# Patient Record
Sex: FEMALE | Race: WHITE | NOT HISPANIC OR LATINO | Employment: OTHER | URBAN - METROPOLITAN AREA
[De-identification: names, ages, dates, MRNs, and addresses within clinical notes are randomized per-mention and may not be internally consistent; named-entity substitution may affect disease eponyms.]

---

## 2024-04-29 ENCOUNTER — OFFICE VISIT (OUTPATIENT)
Dept: GASTROENTEROLOGY | Facility: CLINIC | Age: 69
End: 2024-04-29
Payer: COMMERCIAL

## 2024-04-29 VITALS
DIASTOLIC BLOOD PRESSURE: 62 MMHG | WEIGHT: 140.6 LBS | HEIGHT: 65 IN | SYSTOLIC BLOOD PRESSURE: 106 MMHG | BODY MASS INDEX: 23.43 KG/M2

## 2024-04-29 DIAGNOSIS — Z12.11 SCREENING FOR COLON CANCER: ICD-10-CM

## 2024-04-29 DIAGNOSIS — K58.1 IRRITABLE BOWEL SYNDROME WITH CONSTIPATION: ICD-10-CM

## 2024-04-29 DIAGNOSIS — K59.09 OTHER CONSTIPATION: Primary | ICD-10-CM

## 2024-04-29 PROCEDURE — 99203 OFFICE O/P NEW LOW 30 MIN: CPT | Performed by: INTERNAL MEDICINE

## 2024-04-29 RX ORDER — LEVOTHYROXINE SODIUM 137 MCG
TABLET ORAL
COMMUNITY
Start: 2024-01-23

## 2024-04-29 RX ORDER — HYOSCYAMINE SULFATE 0.125 MG
125 TABLET ORAL EVERY 4 HOURS PRN
COMMUNITY
Start: 2024-01-31

## 2024-04-29 RX ORDER — PAROXETINE 30 MG/1
30 TABLET, FILM COATED ORAL DAILY
COMMUNITY
Start: 2024-01-23

## 2024-04-29 RX ORDER — ALPRAZOLAM 0.5 MG/1
0.5 TABLET ORAL
COMMUNITY
Start: 2024-03-29

## 2024-04-29 NOTE — PROGRESS NOTES
Formerly [facility] - Outpatient Consultation  Miriam Addison 69 y.o. female MRN: [id number]  Encounter: [id number]    ASSESSMENT AND PLAN:      1. Other constipation  Chronic longstanding.  Up-to-date on recommended colonoscopy her last 1 was 8 years ago she has no family history she was told to return in 10 years.  I suspect this is primarily irritable bowel induced constipation we discussed the role of trigger foods and stress.  We discussed the merits of fiber I did give her a copy of a high-fiber diet.  We discussed treatment alternatives to milk of magnesia including Linzess she wants to think about this.  She is presently happy with her regimen as is.  I have ordered routine blood work including celiac studies to exclude celiac which she does not believe has been done in the past.  Will see her in follow-up in 2 to 3 months.  - CBC; Future  - Comprehensive metabolic panel; Future  - Celiac Disease Panel; Future    2. Irritable bowel syndrome with constipation  As above    3. Screening for colon cancer  Up-to-date with screening guidelines last colonoscopy 8 years ago-done elsewhere- no family history.      Followup Appointment: 2 to 3 months  ______________________________________________________________________    Chief Complaint   Patient presents with    Establish Care     HX- IBS    GERD       HPI:   Miriam Addison is a very pleasant 69 y.o. year old female who presents to establish care for longstanding constipation predominant irritable bowel.  She reports that she was diagnosed with chronic constipation and irritable bowel syndrome decades ago.  Grayed out certain foods that help her in some that make her worse.  She has noticed high-fiber cereal makes her feel better and certain fruits and vegetables.  She was followed by Dr. Layo Moralez in [place] who recently retired.  Her last colonoscopy was 8 years ago she was told it was negative and was told that she should return  "in 10 years.  She has no rectal bleeding or melena and no weight loss she gets twinges of abdominal pain that last only seconds.  She does have bloating and distention she uses milk of magnesia 3 times a week and Dulcolax every so often perhaps every 2 to 3 weeks to treat her constipation.  She says she eats a high-fiber diet but on questioning am not sure she is completely aware of how much fiber is and what she consumes.  She denies any significant heartburn or indigestion no dysphagia she does report taking Prilosec for a sore throat which did help very quickly.  This was years ago.    Historical Information   Past Medical History:   Diagnosis Date    Hashimoto's disease     IBS (irritable bowel syndrome)      Past Surgical History:   Procedure Laterality Date    COLONOSCOPY  2016    [facility] [facility]    LIPOMA RESECTION       Social History     Substance and Sexual Activity   Alcohol Use Yes    Comment: 2 week     Social History     Substance and Sexual Activity   Drug Use Never     Social History     Tobacco Use   Smoking Status Never   Smokeless Tobacco Never     Family History   Problem Relation Age of Onset    Colon cancer Maternal Aunt        Meds/Allergies     Current Outpatient Medications:     ALPRAZolam (XANAX) 0.5 mg tablet    Cholecalciferol 50 MCG (2000 UT) CAPS    ECHINACEA COMPLEX PO    hyoscyamine (ANASPAZ,LEVSIN) 0.125 MG tablet    PARoxetine (PAXIL) 30 mg tablet    Synthroid 137 MCG tablet    No Known Allergies    PHYSICAL EXAM:    Blood pressure 106/62, height 5' 5.25\" (1.657 m), weight 63.8 kg (140 lb 9.6 oz). Body mass index is 23.22 kg/m².  General Appearance: NAD, cooperative, alert  Eyes: Anicteric, conjunctiva pink   ENT:  Normocephalic, atraumatic, normal mucosa.    Neck:  Supple, symmetrical, trachea midline,   Resp:  Clear to auscultation bilaterally; no rales, rhonchi or wheezing; respirations unlabored   CV:  S1 S2, Regular rate and rhythm; no murmur, rub, or gallop.  GI:  Soft, " "non-tender, non-distended; normal bowel sounds; no masses, no organomegaly   Rectal: Deferred  Musculoskeletal: No cyanosis, clubbing or edema. Normal ROM.  Skin:  No jaundice, rashes, or lesions   Heme/Lymph: No palpable cervical lymphadenopathy  Psych: Normal affect, good eye contact  Neuro: No gross deficits, AAOx3    Lab Results:   No results found for: \"WBC\", \"HGB\", \"HCT\", \"MCV\", \"PLT\"  No results found for: \"NA\", \"K\", \"CL\", \"CO2\", \"ANIONGAP\", \"BUN\", \"CREATININE\", \"GLUCOSE\", \"GLUF\", \"CALCIUM\", \"CORRECTEDCA\", \"AST\", \"ALT\", \"ALKPHOS\", \"PROT\", \"BILITOT\", \"EGFR\"      Radiology Results:   No results found.      REVIEW OF SYSTEMS:    CONSTITUTIONAL: Denies any fever, chills, rigors, and weight loss.  HEENT: No earache or tinnitus. Denies hearing loss or visual disturbances.  CARDIOVASCULAR: No chest pain or palpitations.   RESPIRATORY: Denies any cough, hemoptysis, shortness of breath or dyspnea on exertion.  GASTROINTESTINAL: As noted in the History of Present Illness.   GENITOURINARY: No problems with urination. Denies any hematuria or dysuria.  NEUROLOGIC: No dizziness or vertigo, denies headaches.   MUSCULOSKELETAL: Denies any muscle or joint pain.   SKIN: Denies skin rashes or itching.   ENDOCRINE: Denies excessive thirst. Denies intolerance to heat or cold.  PSYCHOSOCIAL: Denies depression or anxiety. Denies any recent memory loss.     "

## 2024-05-08 LAB
ALBUMIN SERPL-MCNC: 4.3 G/DL (ref 3.6–5.1)
ALBUMIN/GLOB SERPL: 1.5 (CALC) (ref 1–2.5)
ALP SERPL-CCNC: 66 U/L (ref 37–153)
ALT SERPL-CCNC: 13 U/L (ref 6–29)
AST SERPL-CCNC: 19 U/L (ref 10–35)
BILIRUB SERPL-MCNC: 0.3 MG/DL (ref 0.2–1.2)
BUN SERPL-MCNC: 17 MG/DL (ref 7–25)
BUN/CREAT SERPL: ABNORMAL (CALC) (ref 6–22)
CALCIUM SERPL-MCNC: 9.6 MG/DL (ref 8.6–10.4)
CHLORIDE SERPL-SCNC: 104 MMOL/L (ref 98–110)
CO2 SERPL-SCNC: 27 MMOL/L (ref 20–32)
CREAT SERPL-MCNC: 0.79 MG/DL (ref 0.5–1.05)
ERYTHROCYTE [DISTWIDTH] IN BLOOD BY AUTOMATED COUNT: 12.5 % (ref 11–15)
GFR/BSA.PRED SERPLBLD CYS-BASED-ARV: 81 ML/MIN/1.73M2
GLOBULIN SER CALC-MCNC: 2.9 G/DL (CALC) (ref 1.9–3.7)
GLUCOSE SERPL-MCNC: 107 MG/DL (ref 65–99)
HCT VFR BLD AUTO: 37.2 % (ref 35–45)
HGB BLD-MCNC: 12.3 G/DL (ref 11.7–15.5)
MCH RBC QN AUTO: 28.5 PG (ref 27–33)
MCHC RBC AUTO-ENTMCNC: 33.1 G/DL (ref 32–36)
MCV RBC AUTO: 86.1 FL (ref 80–100)
PLATELET # BLD AUTO: 314 THOUSAND/UL (ref 140–400)
PMV BLD REES-ECKER: 10 FL (ref 7.5–12.5)
POTASSIUM SERPL-SCNC: 4.2 MMOL/L (ref 3.5–5.3)
PROT SERPL-MCNC: 7.2 G/DL (ref 6.1–8.1)
RBC # BLD AUTO: 4.32 MILLION/UL (ref 3.8–5.1)
SODIUM SERPL-SCNC: 138 MMOL/L (ref 135–146)
WBC # BLD AUTO: 6 THOUSAND/UL (ref 3.8–10.8)